# Patient Record
Sex: MALE | Race: OTHER | NOT HISPANIC OR LATINO
[De-identification: names, ages, dates, MRNs, and addresses within clinical notes are randomized per-mention and may not be internally consistent; named-entity substitution may affect disease eponyms.]

---

## 2020-03-13 PROBLEM — Z00.00 ENCOUNTER FOR PREVENTIVE HEALTH EXAMINATION: Status: ACTIVE | Noted: 2020-03-13

## 2020-04-06 ENCOUNTER — APPOINTMENT (OUTPATIENT)
Dept: PEDIATRIC ALLERGY IMMUNOLOGY | Facility: CLINIC | Age: 50
End: 2020-04-06
Payer: MEDICAID

## 2020-04-06 VITALS — SYSTOLIC BLOOD PRESSURE: 135 MMHG | WEIGHT: 315 LBS | HEART RATE: 118 BPM | DIASTOLIC BLOOD PRESSURE: 85 MMHG

## 2020-04-06 VITALS — HEIGHT: 66 IN | BODY MASS INDEX: 52.94 KG/M2

## 2020-04-06 DIAGNOSIS — G47.30 SLEEP APNEA, UNSPECIFIED: ICD-10-CM

## 2020-04-06 DIAGNOSIS — M51.26 OTHER INTERVERTEBRAL DISC DISPLACEMENT, LUMBAR REGION: ICD-10-CM

## 2020-04-06 DIAGNOSIS — Z86.79 PERSONAL HISTORY OF OTHER DISEASES OF THE CIRCULATORY SYSTEM: ICD-10-CM

## 2020-04-06 DIAGNOSIS — Z82.5 FAMILY HISTORY OF ASTHMA AND OTHER CHRONIC LOWER RESPIRATORY DISEASES: ICD-10-CM

## 2020-04-06 DIAGNOSIS — L85.3 XEROSIS CUTIS: ICD-10-CM

## 2020-04-06 DIAGNOSIS — Z83.6 FAMILY HISTORY OF OTHER DISEASES OF THE RESPIRATORY SYSTEM: ICD-10-CM

## 2020-04-06 DIAGNOSIS — J30.1 ALLERGIC RHINITIS DUE TO POLLEN: ICD-10-CM

## 2020-04-06 DIAGNOSIS — T39.015A ADVERSE EFFECT OF ASPIRIN, INITIAL ENCOUNTER: ICD-10-CM

## 2020-04-06 DIAGNOSIS — H10.13 ACUTE ATOPIC CONJUNCTIVITIS, BILATERAL: ICD-10-CM

## 2020-04-06 DIAGNOSIS — T36.0X5A ADVERSE EFFECT OF PENICILLINS, INITIAL ENCOUNTER: ICD-10-CM

## 2020-04-06 DIAGNOSIS — I10 ESSENTIAL (PRIMARY) HYPERTENSION: ICD-10-CM

## 2020-04-06 DIAGNOSIS — F17.200 NICOTINE DEPENDENCE, UNSPECIFIED, UNCOMPLICATED: ICD-10-CM

## 2020-04-06 PROCEDURE — 99204 OFFICE O/P NEW MOD 45 MIN: CPT

## 2020-04-06 RX ORDER — LEVALBUTEROL TARTRATE 45 UG/1
45 AEROSOL, METERED ORAL
Refills: 0 | Status: ACTIVE | COMMUNITY

## 2020-04-06 RX ORDER — KETOTIFEN FUMARATE 0.25 MG/ML
0.03 SOLUTION/ DROPS OPHTHALMIC
Qty: 1 | Refills: 3 | Status: ACTIVE | COMMUNITY
Start: 2020-04-06 | End: 1900-01-01

## 2020-04-06 RX ORDER — FLUTICASONE FUROATE AND VILANTEROL TRIFENATATE 200; 25 UG/1; UG/1
200-25 POWDER RESPIRATORY (INHALATION)
Refills: 0 | Status: ACTIVE | COMMUNITY

## 2020-04-06 RX ORDER — METOPROLOL SUCCINATE 50 MG/1
50 TABLET, EXTENDED RELEASE ORAL
Refills: 0 | Status: ACTIVE | COMMUNITY

## 2020-04-06 RX ORDER — DILTIAZEM HYDROCHLORIDE 180 MG/1
180 CAPSULE, COATED, EXTENDED RELEASE ORAL
Refills: 0 | Status: ACTIVE | COMMUNITY

## 2020-04-06 RX ORDER — HYDROCHLOROTHIAZIDE 25 MG/1
25 TABLET ORAL
Refills: 0 | Status: ACTIVE | COMMUNITY

## 2020-04-06 RX ORDER — MELOXICAM 15 MG/1
TABLET ORAL
Refills: 0 | Status: ACTIVE | COMMUNITY

## 2020-04-06 RX ORDER — CHROMIUM 200 MCG
TABLET ORAL
Refills: 0 | Status: ACTIVE | COMMUNITY

## 2020-04-06 RX ORDER — FLUTICASONE PROPIONATE 50 UG/1
50 SPRAY, METERED NASAL
Refills: 0 | Status: ACTIVE | COMMUNITY

## 2020-04-06 RX ORDER — CYCLOBENZAPRINE HYDROCHLORIDE 10 MG/1
10 TABLET, FILM COATED ORAL
Refills: 0 | Status: ACTIVE | COMMUNITY

## 2020-04-06 RX ORDER — PNV NO.95/FERROUS FUM/FOLIC AC 28MG-0.8MG
TABLET ORAL
Refills: 0 | Status: ACTIVE | COMMUNITY

## 2020-04-06 NOTE — HISTORY OF PRESENT ILLNESS
[16 - 19] : 16 - 19 [Eczematous rashes] : eczematous rashes [Food Allergies] : food allergies [de-identified] : DENTON ISAACS is a 49 year  old male with hypertension, back pain, sleep apnea, asthma and Allergic Rhinitis was referred by Dr Chappell for evaluation of elevated IgE.\par \par He has had asthma in childhood. He was asymptomatic until last summer when he got sick with Lyme. Dr Denton Kebede is his pulmonologist. He needed oral steroids once 8/2019 and his asthma has been well controlled on Breo.  \par He gained about 70 lb since last June. He is an  and he stopped working beginning of 2019 due to back pain. He has been receiving steroid? injections for back pain.\par \par Allergic Rhinitis:\par He has IgE positive to multiple environmental allergens, including dust mites, cat, dog, rose, molds, tree, grass and weed pollen. He has minor symptoms on nasal fluticasone and he reports that Dr Chappell is working on pre approval for Levocetirizine. \par He never had nasal polyps and his sense of smell is intact. \par \par His IgE was 15K (10/2019) and 6,586 (2/2020). One available CBC (2/2020) is normal without peripheral eosinophilia.\par He has not traveled abroad in over 20 years.\par \par 11/2019- hgb dropped from 15 to 10. He saw Dr Harris, hematologist that anemia was due to antibiotic (patient is not sure which one)  that he was treated for Lyme disease with. Anemia resolved. \par He has history of tachycardia which is controlled on a beta-blocker. \par \par Drug reactions:\par Penicillin- tongue and finger swelling as a child\par Aspirin- runny nose and itchy eyes, no SOB. He tolerates Mobic. \par  [FreeTextEntry7] : 18

## 2020-04-06 NOTE — REVIEW OF SYSTEMS
[Eye Discharge] : eye discharge [Eye Redness] : redness [Rhinorrhea] : rhinorrhea [Nasal Congestion] : nasal congestion [Snoring] : snoring [Post Nasal Drip] : post nasal drip [Wheezing] : wheezing [Dry Skin] : ~L dry skin [Fever] : no fever [Sneezing] : no sneezing [Cough] : no cough [Vomiting] : no vomiting [Diarrhea] : no diarrhea [Abdominal Pain] : no abdominal pain [Seizure] : no seizures [Headache] : no headache [Urticaria] : no urticaria [Atopic Dermatitis] : no atopic dermatitis [Pruritis] : no pruritis [Swelling] : no swelling [Easy Bruising] : no tendency for easy bruising [Easy Bleeding] : no ~M tendency for easy bleeding [Nosebleeds] : no epistaxis [Recurrent Sinus Infections] : no recurrent sinus infections [Recurrent Throat Infections] : no recurrence of throat infections [Recurrent Bronchitis] : no recurrent bronchitis [Recurrent Ear Infections] : no recurrence or ear infections [Recurrent Skin Infections] : no recurrent skin infections [Recurrent Pneumonia] : no ~T recurrent pneumonia [FreeTextEntry5] : HTN, h/o tachycardia [FreeTextEntry6] : worse at night [FreeTextEntry9] : back pain

## 2020-04-06 NOTE — CONSULT LETTER
[Dear  ___] : Dear  [unfilled], [Consult Letter:] : I had the pleasure of evaluating your patient, [unfilled]. [Please see my note below.] : Please see my note below. [Consult Closing:] : Thank you very much for allowing me to participate in the care of this patient.  If you have any questions, please do not hesitate to contact me. [Sincerely,] : Sincerely, [DrAnthony  ___] : Dr. WILLIAM [DrAnthony ___] : Dr. WILLIAM [FreeTextEntry3] : Ximena Walsh MD FAABERTHAI, NELL\par Adult and Pediatric Allergy, Asthma and Clinical Immunology\par  of Medicine and Pediatrics at\par   [facility]\par Section Head, Adult Allergy and Immunology\par   [facility]\par   Division of Allergy, Asthma and Immunology\par   [address], [place] [address]\par   [place], [place] [address]\par   Phone [phone]  Fax [phone]\par \par \par

## 2020-04-06 NOTE — DATA REVIEWED
[FreeTextEntry1] : IgE was 15K (10/2019) and 6,586 (2/2020). One available CBC didn't show peripheral eosinophilia\par \par IgE positive for multiple environmental allergens.

## 2020-04-06 NOTE — PHYSICAL EXAM
[Alert] : alert [No Acute Distress] : no acute distress [No Discharge] : no discharge [No Photophobia] : no photophobia [Sclera Not Icteric] : sclera not icteric [Conjunctival Erythema] : conjunctival erythema [Normal TMs] : both tympanic membranes were normal [No Thrush] : no thrush [No Oral Lesions or Ulcers] : no oral lesions or ulcers [Normal Rate and Effort] : normal respiratory rhythm and effort [No Crackles] : no crackles [Bilateral Audible Breath Sounds] : bilateral audible breath sounds [Normal Rate] : heart rate was normal  [Regular Rhythm] : with a regular rhythm [Soft] : abdomen soft [No HSM] : no hepato-splenomegaly [Normal Cervical Lymph Nodes] : cervical [Normal Axillary Lumph Nodes] : axillary [No Rash] : no rash [Xerosis] : xerosis [No clubbing] : no clubbing [No Edema] : no edema [No Cyanosis] : no cyanosis [Normal Mood] : mood was normal [Normal Affect] : affect was normal [Alert, Awake, Oriented as Age-Appropriate] : alert, awake, oriented as age appropriate [Wheezing] : no wheezing was heard

## 2020-04-07 LAB
BASOPHILS # BLD AUTO: 0.13 K/UL
BASOPHILS NFR BLD AUTO: 1.2 %
EOSINOPHIL # BLD AUTO: 0.1 K/UL
EOSINOPHIL NFR BLD AUTO: 0.9 %
HCT VFR BLD CALC: 50.3 %
HGB BLD-MCNC: 16.8 G/DL
IMM GRANULOCYTES NFR BLD AUTO: 0.4 %
LYMPHOCYTES # BLD AUTO: 2.81 K/UL
LYMPHOCYTES NFR BLD AUTO: 25.1 %
MAN DIFF?: NORMAL
MCHC RBC-ENTMCNC: 30.7 PG
MCHC RBC-ENTMCNC: 33.4 GM/DL
MCV RBC AUTO: 92 FL
MONOCYTES # BLD AUTO: 0.96 K/UL
MONOCYTES NFR BLD AUTO: 8.6 %
NEUTROPHILS # BLD AUTO: 7.17 K/UL
NEUTROPHILS NFR BLD AUTO: 63.8 %
PLATELET # BLD AUTO: 323 K/UL
RBC # BLD: 5.47 M/UL
RBC # FLD: 14.6 %
WBC # FLD AUTO: 11.21 K/UL

## 2020-04-09 LAB — TOTAL IGE SMQN RAST: 7590 KU/L

## 2021-06-02 ENCOUNTER — APPOINTMENT (OUTPATIENT)
Dept: GERIATRICS | Facility: CLINIC | Age: 51
End: 2021-06-02

## 2021-06-21 ENCOUNTER — APPOINTMENT (OUTPATIENT)
Dept: PEDIATRIC ALLERGY IMMUNOLOGY | Facility: CLINIC | Age: 51
End: 2021-06-21

## 2021-06-21 ENCOUNTER — APPOINTMENT (OUTPATIENT)
Dept: PEDIATRIC ALLERGY IMMUNOLOGY | Facility: CLINIC | Age: 51
End: 2021-06-21
Payer: MEDICAID

## 2021-06-21 ENCOUNTER — NON-APPOINTMENT (OUTPATIENT)
Age: 51
End: 2021-06-21

## 2021-06-21 VITALS
HEART RATE: 82 BPM | TEMPERATURE: 97.3 F | RESPIRATION RATE: 16 BRPM | DIASTOLIC BLOOD PRESSURE: 74 MMHG | SYSTOLIC BLOOD PRESSURE: 109 MMHG

## 2021-06-21 VITALS — WEIGHT: 315 LBS | BODY MASS INDEX: 54.68 KG/M2

## 2021-06-21 DIAGNOSIS — J30.89 OTHER ALLERGIC RHINITIS: ICD-10-CM

## 2021-06-21 DIAGNOSIS — Z51.6 ENCOUNTER FOR DESENSITIZATION TO ALLERGENS: ICD-10-CM

## 2021-06-21 DIAGNOSIS — R76.8 OTHER SPECIFIED ABNORMAL IMMUNOLOGICAL FINDINGS IN SERUM: ICD-10-CM

## 2021-06-21 DIAGNOSIS — Z88.8 ALLERGY STATUS TO OTHER DRUGS, MEDICAMENTS AND BIOLOGICAL SUBSTANCES: ICD-10-CM

## 2021-06-21 LAB — SARS-COV-2 N GENE NPH QL NAA+PROBE: NOT DETECTED

## 2021-06-21 PROCEDURE — 95180 RAPID DESENSITIZATION: CPT

## 2021-06-25 PROBLEM — Z51.6 NEED FOR DESENSITIZATION TO ALLERGENS: Status: ACTIVE | Noted: 2021-06-25

## 2021-06-25 NOTE — PHYSICAL EXAM
[Alert] : alert [Well Nourished] : well nourished [No Acute Distress] : no acute distress [Well Developed] : well developed [Sclera Not Icteric] : sclera not icteric [Normal TMs] : both tympanic membranes were normal [Normal Tonsils] : normal tonsils [Normal Rate and Effort] : normal respiratory rhythm and effort [No Crackles] : no crackles [Bilateral Audible Breath Sounds] : bilateral audible breath sounds [Normal Rate] : heart rate was normal  [Normal S1, S2] : normal S1 and S2 [No murmur] : no murmur [Regular Rhythm] : with a regular rhythm [Skin Intact] : skin intact  [No Rash] : no rash [No Skin Lesions] : no skin lesions [Normal Mood] : mood was normal [Normal Affect] : affect was normal [Judgment and Insight Age Appropriate] : judgement and insight is age appropriate [Alert, Awake, Oriented as Age-Appropriate] : alert, awake, oriented as age appropriate [Conjunctival Erythema] : no conjunctival erythema [Boggy Nasal Turbinates] : no boggy and/or pale nasal turbinates [Pharyngeal erythema] : no pharyngeal erythema [Posterior Pharyngeal Cobblestoning] : no posterior pharyngeal cobblestoning [Clear Rhinorrhea] : no clear rhinorrhea was seen [Exudate] : no exudate [Wheezing] : no wheezing was heard [Patches] : no patches

## 2021-06-25 NOTE — REVIEW OF SYSTEMS
[Nl] : Integumentary [Fatigue] : no fatigue [Eye Discharge] : no eye discharge [Eye Redness] : no redness [Puffy Eyelids] : no puffy ~T eyelids [Bloodshot Eyes] : no bloodshot ~T eyes [Rhinorrhea] : no rhinorrhea [Snoring] : no snoring [Post Nasal Drip] : no post nasal drip [Cyanosis] : no cyanosis [Chest Pain] : no chest pain or discomfort [Cough] : no cough [Wheezing Worsens With Exercise] : wheezing does not worsen with exercise [Wheezing] : no wheezing

## 2021-06-25 NOTE — CONSULT LETTER
[Dear  ___] : Dear  [unfilled], [Consult Letter:] : I had the pleasure of evaluating your patient, [unfilled]. [Please see my note below.] : Please see my note below. [Consult Closing:] : Thank you very much for allowing me to participate in the care of this patient.  If you have any questions, please do not hesitate to contact me. [Sincerely,] : Sincerely, [FreeTextEntry2] : Dr Reis, cardiology , [place].  [FreeTextEntry3] : [facility] Nunes [facility]\par \par MD SIERRA Harmon, NELL\par Adult and Pediatric Allergy, Asthma and Clinical Immunology\par  of Medicine and Pediatrics at\par   [facility]\par Section Head, Adult Allergy and Immunology\par   [facility]\par   Division of Allergy, Asthma and Immunology\par   [address] [place] [address], [address]\par   [place], [place] [address]\par   Phone [phone]  Fax [phone]\par \par

## 2021-06-25 NOTE — HISTORY OF PRESENT ILLNESS
[Asthma] : asthma  [] : The following medications are to be available during the challenge procedure: [Consent obtained and signed form scanned in to chart] : Consent obtained and signed form scanned in to chart [Diphenhydramine] : Diphenhydramine, 1-2mg/kg IM (max dose 50mg), (50mg/1 cc) [Solucortef] : Solucortef, 4-8 mg/kg IM (max dose 200 mg), (100mg/2 cc) [Epinephrine 1:1000 IM] : Epinephrine 1:1000 IM, 0.01cc/kg (max dose 0.5 cc) [Albuterol MDI] : Albuterol MDI, 2 - 4 puffs [Albuterol nebulized] : Albuterol nebulized, 0.083% [_______] : Time: [unfilled] [Clear] : Skin Findings: Clear [No] : Reaction: No [___] : RR: [unfilled]  [____] : IVB: [unfilled] [___] : Time: [unfilled] [___% 1) Skin -  A) Erythematous rash - % area involved] : Erythematous Rash (IA): [unfilled] % area involved [0 Pruritus: 0  - absent] : Pruritus (IB): 0 - absent [0 Urticaria/Angioedema: 0 - Absent] : Urticaria/Angioedema (IC): 0  - Absent [0 Rash: 0 - Absent] : Rash (ID): 0 - Absent [0 Sneezing/Itchin - Absent] : Sneezing/Itching (IIA): 0 - Absent [0 Nasal congestion: 0 - Absent] : Nasal congestion (IIB): 0 - Absent [0 Rhinorrhea: 0 - Absent] : Rhinorrhea (IIC): 0 - Absent [0 Laryngeal: 0 - Absent] : Laryngeal (IID): 0 - Absent [0 Wheezin - Absent] : Wheezing (IIIA): 0 - Absent [0 Gastro-Subjective complaints: 0 - Absent] : Gastro-Subjective Complaints (NAEL): 0 - Absent [0 Gastro-Objective complaints: 0 - Absent] : Gastro-Objective Complaints (IVB): 0 - Absent [Antihistamine use in past 5 days] : No antihistamine use in past 5 days [Recent Illness] : no recent illness [Fever] : no fever [de-identified] : PINKY ISAACS is a 50 year old male with hypertension, back pain, sleep apnea, asthma and Allergic Rhinitis, elevated IgE here for Aspirin desensitization. The patient needs a cardiac stent, thus was refereed by his cardiologist for  Aspirin desensitization. \par \par Drug reactions:\par Penicillin- tongue and finger swelling as a child. Unsure if it was immediate or delayed. No oral ulcer, desquamation of skin or ER visit. \par Aspirin- runny nose and itchy eyes, no SOB. He tolerates Mobic. \par \par Asthma: \par Managed by pulm. Well controlled on Xolair( started three months ago) and  trelegy. Denies any wheezing, shortness of breath or chest tightness. Last use of Levalbuterol was Saturday, due shortness of breath, with improvement. No oral steroid use within last year. Followed by Pulm, at [facility]. Dr Denton Kebede. ACT 19. \par \par Elevated IGE:\par Followed by outside allergist. \par IgE - 7K, stable. Last IGE was on OCt 2020- 6500.  On Azelastine and fluticasone. \par \par Allergic Rhinitis:\par He has IgE positive to multiple environmental allergens, including dust mites, cat, dog, rose, molds, tree, grass and weed pollen. He has minor symptoms on nasal fluticasone and he reports that Dr Chappell is working on pre approval for Levocetirizine. \par He never had nasal polyps and his sense of smell is intact. \par \par His IgE was 15K (10/2019) and 6,586 (2/2020). One available CBC (2/2020) is normal without peripheral eosinophilia.\par He has not traveled abroad in over 20 years.\par \par \par \par \par No nasal polyps. \par \par \par Past history. \par  He was asymptomatic until last summer when he got sick with Lyme. Dr Denton Kebede is his pulmonologist. He needed oral steroids once 8/2019 and his asthma has been well controlled on Breo. \par He gained about 70 lb since last June. He is an  and he stopped working beginning of 2019 due to back pain. He has been receiving steroid? injections for back pain.\par \par Allergic Rhinitis:\par He has IgE positive to multiple environmental allergens, including dust mites, cat, dog, rose, molds, tree, grass and weed pollen. He has minor symptoms on nasal fluticasone and he reports that Dr Chappell is working on pre approval for Levocetirizine. \par He never had nasal polyps and his sense of smell is intact. \par \par His IgE was 15K (10/2019) and 6,586 (2/2020). One available CBC (2/2020) is normal without peripheral eosinophilia.\par He has not traveled abroad in over 20 years.\par \par 11/2019- hgb dropped from 15 to 10. He saw Dr Harris, hematologist that anemia was due to antibiotic (patient is not sure which one) that he was treated for Lyme disease with. Anemia resolved. \par He has history of tachycardia which is controlled on a beta-blocker. \par \par \par  \par No history or symptoms of eczematous rashes, food allergies. \par \par ACT Questionnaire Group: 16 - 19 \par ACT Questionnaire Score: 18 \par  [FreeTextEntry1] : Aspirin  [FreeTextEntry2] : 100mg

## 2021-07-07 ENCOUNTER — TRANSCRIPTION ENCOUNTER (OUTPATIENT)
Age: 51
End: 2021-07-07

## 2021-07-09 DIAGNOSIS — Z01.818 ENCOUNTER FOR OTHER PREPROCEDURAL EXAMINATION: ICD-10-CM

## 2021-07-10 ENCOUNTER — APPOINTMENT (OUTPATIENT)
Dept: DISASTER EMERGENCY | Facility: CLINIC | Age: 51
End: 2021-07-10

## 2021-07-11 LAB — SARS-COV-2 N GENE NPH QL NAA+PROBE: NOT DETECTED

## 2021-07-12 RX ORDER — CHLORHEXIDINE GLUCONATE 213 G/1000ML
1 SOLUTION TOPICAL ONCE
Refills: 0 | Status: DISCONTINUED | OUTPATIENT
Start: 2021-07-13 | End: 2021-07-28

## 2021-07-12 NOTE — H&P PST ADULT - NSICDXPASTMEDICALHX_GEN_ALL_CORE_FT
PAST MEDICAL HISTORY:  Abnormal stress test     HLD (hyperlipidemia)     HTN (hypertension)     Severe obesity

## 2021-07-12 NOTE — H&P PST ADULT - ASSESSMENT
49 yo male with c/o BARRETT and an abnormal stress test.     Plan: PRE-PROCEDURE ASSESSMENT    -Patient seen and examined  PRE-PROCEDURE ASSESSMENT  -NPO after midnight confirmed  -IV insert  -Patient seen and examined  -Labs reviewed  -Pre-procedure teaching completed with patient   -consent obtained   -Questions answered     49 yo male with cardiac history significant for Htn, HLD, obesity, current smoker c/o BARRETT and + NST, plan for LHC via RA vs FA by Dr. Hernandez.     -plan for LHC via RA  -patient seen and examined  -confirmed appropriate NPO duration  -ECG and Labs reviewed  -Aspirin 81mg po pre-cath  -Percocet tab X 1 for chronic back pain  -procedure discussed with patient; risks and benefits explained, questions answered  -consent obtained

## 2021-07-12 NOTE — H&P PST ADULT - HISTORY OF PRESENT ILLNESS
Narrative: This is a 49 yo male with PMHx of HTN, HLD, and obesity.  He presented to his cardiologist with c/o BARRETT and was sent for an echocardiogram and stress test.  During his stress test he exhibited 0.5-0.9mm downsloping ST depression and a small sized equivocally abnormal, inferior , fixed defect was evidenced on imaging.      ASA  Mallampati  GFR  Creat  Bleeding Risk  COVID19 - Covid negative 7/11/2021    Symptoms:        Angina (Class): 3       Ischemic Symptoms: BARRETT     Heart Failure: No            Assessment of LVEF:       EF: 55-60%       Assessed by: TTE        Date: 5/26/2021    Prior Cardiac Interventions: None          Noninvasive Testing:   Stress Test: Date: 5/27/2021       Protocol: Regadenoson Sestamibi Stress       Duration of Exercise: NA       Symptoms: Dyspnea       EKG Changes: 0.5-0.9 mm downsloping ST depression       DTS: NA       Myocardial Imaging:  small sized equivocally abnormal, inferior , fixed defect        Risk Assessment: low    Echo: 5/26/2021  Technically difficult study  Normal global LV contractility  LV systolic function normal, EF 55-60%  Moderate LVH  Normal diastolic filling pattern  RV moderately enlarged  Trace mitral regurgitation  Trace tricuspid regurgitation      Antianginal Therapies:        Beta Blockers:  Metoprolol 50 mg po BID       Calcium Channel Blockers: Dardizem  mg po Daily       Long Acting Nitrates:        Ranexa:     Associated Risk Factors:        Cerebrovascular Disease: N/A       Chronic Lung Disease: N/A       Peripheral Arterial Disease: N/A       Chronic Kidney Disease (if yes, what is GFR): N/A       Uncontrolled Diabetes (if yes, what is HgbA1C or FBS): N/A       Poorly Controlled Hypertension (if yes, what is SBP): N/A       Morbid Obesity (if yes, what is BMI): Yes       History of Recent Ventricular Arrhythmia: N/A       Inability to Ambulate Safely: N/A       Need for Therapeutic Anticoagulation: N/A       Antiplatelet or Contrast Allergy: N/A Narrative: This is a 49 yo male with PMHx of HTN, HLD, obesity (BMI 55), LU on CPAP, Asthma, current smoker. He presented to his cardiologist with c/o BARRETT and was sent for an echocardiogram and stress test.  During his stress test he exhibited 0.5-0.9mm downsloping ST depression and a small sized equivocally abnormal, inferior , fixed defect was evidenced on imaging.      ASA 3  Mallampati 3  GFR  Creat  Bleeding Risk  COVID19 - Covid negative 7/11/2021    Symptoms:        Angina (Class): 3       Ischemic Symptoms: BARRETT     Heart Failure: No            Assessment of LVEF:       EF: 55-60%       Assessed by: TTE        Date: 5/26/2021    Prior Cardiac Interventions: None          Noninvasive Testing:   Stress Test: Date: 5/27/2021       Protocol: Regadenoson Sestamibi Stress       Duration of Exercise: NA       Symptoms: Dyspnea       EKG Changes: 0.5-0.9 mm downsloping ST depression       DTS: NA       Myocardial Imaging:  small sized equivocally abnormal, inferior , fixed defect        Risk Assessment: low    Echo: 5/26/2021  Technically difficult study  Normal global LV contractility  LV systolic function normal, EF 55-60%  Moderate LVH  Normal diastolic filling pattern  RV moderately enlarged  Trace mitral regurgitation  Trace tricuspid regurgitation      Antianginal Therapies:        Beta Blockers:  Metoprolol 50 mg po BID       Calcium Channel Blockers: Dardizem  mg po Daily       Long Acting Nitrates:        Ranexa:     Associated Risk Factors:        Cerebrovascular Disease: N/A       Chronic Lung Disease: yes, LU on CPAP, Asthma, current smoker       Peripheral Arterial Disease: N/A       Chronic Kidney Disease (if yes, what is GFR): N/A       Uncontrolled Diabetes (if yes, what is HgbA1C or FBS): N/A       Poorly Controlled Hypertension (if yes, what is SBP): N/A       Morbid Obesity (if yes, what is BMI): Yes BMI 55       History of Recent Ventricular Arrhythmia: N/A       Inability to Ambulate Safely: N/A       Need for Therapeutic Anticoagulation: N/A       Antiplatelet or Contrast Allergy: Desensitized to ASA and tolerated 81mg po daily    ROS: as stated above, otherwise negative    PHYSICAL EXAM:  Constitutional: A & O x 3  HEENT:   Normal oral mucosa, PERRL, EOMI	  Cardiovascular: Normal S1 S2, No JVD, No murmurs, No edema  Respiratory: Lungs clear to auscultation	  Gastrointestinal:  Soft, Non-tender, + BS	  Skin: No rashes, No ecchymoses, No cyanosis  Neurologic: Non-focal  Extremities: Normal range of motion, No clubbing, cyanosis or edema  Vascular: Peripheral pulses palpable 2+ bilaterally     Narrative: This is a 49 yo male with PMHx of HTN, HLD, obesity (BMI 55), LU on CPAP, Asthma, current smoker. He presented to his cardiologist with c/o BARRETT and was sent for an echocardiogram and stress test.  During his stress test he exhibited 0.5-0.9mm downsloping ST depression and a small sized equivocally abnormal, inferior , fixed defect was evidenced on imaging.      ASA 3  Mallampati 3  GFR 99  Creat 0.9  Bleeding Risk  COVID19 - Covid negative 7/11/2021    Symptoms:        Angina (Class): 3       Ischemic Symptoms: BARRETT     Heart Failure: No            Assessment of LVEF:       EF: 55-60%       Assessed by: TTE        Date: 5/26/2021    Prior Cardiac Interventions: None          Noninvasive Testing:   Stress Test: Date: 5/27/2021       Protocol: Regadenoson Sestamibi Stress       Duration of Exercise: NA       Symptoms: Dyspnea       EKG Changes: 0.5-0.9 mm downsloping ST depression       DTS: NA       Myocardial Imaging:  small sized equivocally abnormal, inferior , fixed defect        Risk Assessment: low    Echo: 5/26/2021  Technically difficult study  Normal global LV contractility  LV systolic function normal, EF 55-60%  Moderate LVH  Normal diastolic filling pattern  RV moderately enlarged  Trace mitral regurgitation  Trace tricuspid regurgitation      Antianginal Therapies:        Beta Blockers:  Metoprolol 50 mg po BID       Calcium Channel Blockers: Dardizem  mg po Daily       Long Acting Nitrates:        Ranexa:     Associated Risk Factors:        Cerebrovascular Disease: N/A       Chronic Lung Disease: yes, LU on CPAP, Asthma, current smoker       Peripheral Arterial Disease: N/A       Chronic Kidney Disease (if yes, what is GFR): N/A       Uncontrolled Diabetes (if yes, what is HgbA1C or FBS): N/A       Poorly Controlled Hypertension (if yes, what is SBP): N/A       Morbid Obesity (if yes, what is BMI): Yes BMI 55       History of Recent Ventricular Arrhythmia: N/A       Inability to Ambulate Safely: N/A       Need for Therapeutic Anticoagulation: N/A       Antiplatelet or Contrast Allergy: Desensitized to ASA and tolerated 81mg po daily    ROS: as stated above, otherwise negative    PHYSICAL EXAM:  Constitutional: A & O x 3  HEENT:   Normal oral mucosa, PERRL, EOMI	  Cardiovascular: Normal S1 S2, No JVD, No murmurs, No edema  Respiratory: Lungs clear to auscultation	  Gastrointestinal:  Soft, Non-tender, + BS	  Skin: No rashes, No ecchymoses, No cyanosis  Neurologic: Non-focal  Extremities: Normal range of motion, No clubbing, cyanosis or edema  Vascular: Peripheral pulses palpable 2+ bilaterally

## 2021-07-13 ENCOUNTER — TRANSCRIPTION ENCOUNTER (OUTPATIENT)
Age: 51
End: 2021-07-13

## 2021-07-13 ENCOUNTER — OUTPATIENT (OUTPATIENT)
Dept: OUTPATIENT SERVICES | Facility: HOSPITAL | Age: 51
LOS: 1 days | End: 2021-07-13
Payer: MEDICAID

## 2021-07-13 VITALS
SYSTOLIC BLOOD PRESSURE: 143 MMHG | HEART RATE: 70 BPM | DIASTOLIC BLOOD PRESSURE: 81 MMHG | RESPIRATION RATE: 18 BRPM | OXYGEN SATURATION: 96 %

## 2021-07-13 VITALS
HEART RATE: 78 BPM | DIASTOLIC BLOOD PRESSURE: 79 MMHG | RESPIRATION RATE: 15 BRPM | OXYGEN SATURATION: 95 % | SYSTOLIC BLOOD PRESSURE: 139 MMHG

## 2021-07-13 DIAGNOSIS — R94.39 ABNORMAL RESULT OF OTHER CARDIOVASCULAR FUNCTION STUDY: ICD-10-CM

## 2021-07-13 LAB
ALBUMIN SERPL ELPH-MCNC: 4.8 G/DL — SIGNIFICANT CHANGE UP (ref 3.3–5.2)
ALP SERPL-CCNC: 158 U/L — HIGH (ref 40–120)
ALT FLD-CCNC: 41 U/L — HIGH
ANION GAP SERPL CALC-SCNC: 15 MMOL/L — SIGNIFICANT CHANGE UP (ref 5–17)
AST SERPL-CCNC: 23 U/L — SIGNIFICANT CHANGE UP
BASOPHILS # BLD AUTO: 0.07 K/UL — SIGNIFICANT CHANGE UP (ref 0–0.2)
BASOPHILS NFR BLD AUTO: 0.6 % — SIGNIFICANT CHANGE UP (ref 0–2)
BILIRUB SERPL-MCNC: 0.4 MG/DL — SIGNIFICANT CHANGE UP (ref 0.4–2)
BUN SERPL-MCNC: 14.2 MG/DL — SIGNIFICANT CHANGE UP (ref 8–20)
CALCIUM SERPL-MCNC: 9.8 MG/DL — SIGNIFICANT CHANGE UP (ref 8.6–10.2)
CHLORIDE SERPL-SCNC: 103 MMOL/L — SIGNIFICANT CHANGE UP (ref 98–107)
CO2 SERPL-SCNC: 23 MMOL/L — SIGNIFICANT CHANGE UP (ref 22–29)
CREAT SERPL-MCNC: 0.9 MG/DL — SIGNIFICANT CHANGE UP (ref 0.5–1.3)
EOSINOPHIL # BLD AUTO: 0.47 K/UL — SIGNIFICANT CHANGE UP (ref 0–0.5)
EOSINOPHIL NFR BLD AUTO: 4.2 % — SIGNIFICANT CHANGE UP (ref 0–6)
GLUCOSE SERPL-MCNC: 117 MG/DL — HIGH (ref 70–99)
HCT VFR BLD CALC: 48 % — SIGNIFICANT CHANGE UP (ref 39–50)
HGB BLD-MCNC: 16.1 G/DL — SIGNIFICANT CHANGE UP (ref 13–17)
IMM GRANULOCYTES NFR BLD AUTO: 0.4 % — SIGNIFICANT CHANGE UP (ref 0–1.5)
LYMPHOCYTES # BLD AUTO: 18 % — SIGNIFICANT CHANGE UP (ref 13–44)
LYMPHOCYTES # BLD AUTO: 2 K/UL — SIGNIFICANT CHANGE UP (ref 1–3.3)
MCHC RBC-ENTMCNC: 29.9 PG — SIGNIFICANT CHANGE UP (ref 27–34)
MCHC RBC-ENTMCNC: 33.5 GM/DL — SIGNIFICANT CHANGE UP (ref 32–36)
MCV RBC AUTO: 89.2 FL — SIGNIFICANT CHANGE UP (ref 80–100)
MONOCYTES # BLD AUTO: 0.81 K/UL — SIGNIFICANT CHANGE UP (ref 0–0.9)
MONOCYTES NFR BLD AUTO: 7.3 % — SIGNIFICANT CHANGE UP (ref 2–14)
NEUTROPHILS # BLD AUTO: 7.74 K/UL — HIGH (ref 1.8–7.4)
NEUTROPHILS NFR BLD AUTO: 69.5 % — SIGNIFICANT CHANGE UP (ref 43–77)
PLATELET # BLD AUTO: 346 K/UL — SIGNIFICANT CHANGE UP (ref 150–400)
POTASSIUM SERPL-MCNC: 4.1 MMOL/L — SIGNIFICANT CHANGE UP (ref 3.5–5.3)
POTASSIUM SERPL-SCNC: 4.1 MMOL/L — SIGNIFICANT CHANGE UP (ref 3.5–5.3)
PROT SERPL-MCNC: 7.9 G/DL — SIGNIFICANT CHANGE UP (ref 6.6–8.7)
RBC # BLD: 5.38 M/UL — SIGNIFICANT CHANGE UP (ref 4.2–5.8)
RBC # FLD: 12.7 % — SIGNIFICANT CHANGE UP (ref 10.3–14.5)
SODIUM SERPL-SCNC: 141 MMOL/L — SIGNIFICANT CHANGE UP (ref 135–145)
WBC # BLD: 11.13 K/UL — HIGH (ref 3.8–10.5)
WBC # FLD AUTO: 11.13 K/UL — HIGH (ref 3.8–10.5)

## 2021-07-13 PROCEDURE — 93010 ELECTROCARDIOGRAM REPORT: CPT

## 2021-07-13 PROCEDURE — 80053 COMPREHEN METABOLIC PANEL: CPT

## 2021-07-13 PROCEDURE — C1887: CPT

## 2021-07-13 PROCEDURE — 85025 COMPLETE CBC W/AUTO DIFF WBC: CPT

## 2021-07-13 PROCEDURE — 36415 COLL VENOUS BLD VENIPUNCTURE: CPT

## 2021-07-13 PROCEDURE — C1894: CPT

## 2021-07-13 PROCEDURE — 93005 ELECTROCARDIOGRAM TRACING: CPT

## 2021-07-13 PROCEDURE — 99152 MOD SED SAME PHYS/QHP 5/>YRS: CPT

## 2021-07-13 PROCEDURE — 93458 L HRT ARTERY/VENTRICLE ANGIO: CPT

## 2021-07-13 PROCEDURE — C1769: CPT

## 2021-07-13 RX ORDER — AZELASTINE 137 UG/1
2 SPRAY, METERED NASAL
Qty: 0 | Refills: 0 | DISCHARGE

## 2021-07-13 RX ORDER — ASPIRIN/CALCIUM CARB/MAGNESIUM 324 MG
81 TABLET ORAL
Qty: 0 | Refills: 0 | DISCHARGE

## 2021-07-13 RX ORDER — SIMVASTATIN 20 MG/1
1 TABLET, FILM COATED ORAL
Qty: 0 | Refills: 0 | DISCHARGE

## 2021-07-13 RX ORDER — ERGOCALCIFEROL 1.25 MG/1
1 CAPSULE ORAL
Qty: 0 | Refills: 0 | DISCHARGE

## 2021-07-13 RX ORDER — MORPHINE SULFATE 50 MG/1
15 CAPSULE, EXTENDED RELEASE ORAL
Qty: 0 | Refills: 0 | DISCHARGE

## 2021-07-13 RX ORDER — DILTIAZEM HCL 120 MG
1 CAPSULE, EXT RELEASE 24 HR ORAL
Qty: 0 | Refills: 0 | DISCHARGE

## 2021-07-13 RX ORDER — FLUTICASONE FUROATE, UMECLIDINIUM BROMIDE AND VILANTEROL TRIFENATATE 200; 62.5; 25 UG/1; UG/1; UG/1
1 POWDER RESPIRATORY (INHALATION)
Qty: 0 | Refills: 0 | DISCHARGE

## 2021-07-13 RX ORDER — METOPROLOL TARTRATE 50 MG
1 TABLET ORAL
Qty: 0 | Refills: 0 | DISCHARGE

## 2021-07-13 RX ORDER — OXYCODONE AND ACETAMINOPHEN 5; 325 MG/1; MG/1
1 TABLET ORAL EVERY 4 HOURS
Refills: 0 | Status: DISCONTINUED | OUTPATIENT
Start: 2021-07-13 | End: 2021-07-13

## 2021-07-13 RX ORDER — LISINOPRIL 2.5 MG/1
1 TABLET ORAL
Qty: 0 | Refills: 0 | DISCHARGE

## 2021-07-13 RX ORDER — LEVALBUTEROL 1.25 MG/.5ML
0.5 SOLUTION, CONCENTRATE RESPIRATORY (INHALATION)
Qty: 0 | Refills: 0 | DISCHARGE

## 2021-07-13 RX ORDER — FUROSEMIDE 40 MG
1 TABLET ORAL
Qty: 0 | Refills: 0 | DISCHARGE

## 2021-07-13 RX ORDER — FLUTICASONE PROPIONATE 50 MCG
1 SPRAY, SUSPENSION NASAL
Qty: 0 | Refills: 0 | DISCHARGE

## 2021-07-13 RX ADMIN — OXYCODONE AND ACETAMINOPHEN 1 TABLET(S): 5; 325 TABLET ORAL at 13:32

## 2021-07-13 NOTE — DISCHARGE NOTE PROVIDER - NSDCCPTREATMENT_GEN_ALL_CORE_FT
PRINCIPAL PROCEDURE  Procedure: Left heart cardiac cath  Findings and Treatment: Restricted use with no heavy lifting of affected arm for 48 hours.  No submerging the arm in water for 48 hours.  You may start showering today.  Call your doctor for any bleeding, swelling, loss of sensation in the hand or fingers, or fingers turning blue.  If heavy bleeding or large lumps form, hold pressure at the spot and come to the Emergency Room.

## 2021-07-13 NOTE — DISCHARGE NOTE NURSING/CASE MANAGEMENT/SOCIAL WORK - PATIENT PORTAL LINK FT
You can access the FollowMyHealth Patient Portal offered by [facility] by registering at the following website: [email or web address]. By joining [employer]’s FollowMyHealth portal, you will also be able to view your health information using other applications (apps) compatible with our system.

## 2021-07-13 NOTE — DISCHARGE NOTE PROVIDER - HOSPITAL COURSE
51 yo male with PMHx of HTN, HLD, obesity (BMI 55), LU on CPAP, Asthma, current smoker. He presented to his cardiologist with c/o BARRETT and was sent for an echocardiogram and stress test.  During his stress test he exhibited 0.5-0.9mm downsloping ST depression and a small sized equivocally abnormal, inferior , fixed defect was evidenced on imaging.        -plan for LHC via RA  -patient seen and examined  -confirmed appropriate NPO duration  -ECG and Labs reviewed  -Aspirin 81mg po pre-cath  -Percocet tab X 1 for chronic back pain  -procedure discussed with patient; risks and benefits explained, questions answered  -consent obtained       Now s/p left heart catheterization via RRA with no obstructive CAD performed by  Dr. Hernandez, received IA heparin, versed and fentanyl IV intraprocedurally, arrived to recovery in NAD and HDS, RRA access site stable with compression band in place, no bleed/hematoma, distal pulse +, plan to d/C home after recovery period completed.           Plan:  -Formal cath report pending  -Post procedure management/monitoring per protocol  -Access site precautions  -Radial compression band removal at 1630  -Continue current medical therapy  -Educated regarding post procedure management and care  -Discussed the importance of RF modification  -F/U outpt in 1-2 weeks with primary doctor.   -DISPO: Plan for D/C home later today once recovery period completed.

## 2021-07-13 NOTE — DISCHARGE NOTE PROVIDER - NSDCCPCAREPLAN_GEN_ALL_CORE_FT
PRINCIPAL DISCHARGE DIAGNOSIS  Diagnosis: BARRETT (dyspnea on exertion)  Assessment and Plan of Treatment: cardiac catheterization ruled out coronary artery disease as cause for shortness of breath.   Known Asthma, LU on CPAP and smoking. Cont to use bronchodilators and CPAP and f/u with pulmonology as needed. Most former smokers quit without using one of the treatments that scientific research has shown can work. However, the following treatments are proven to be effective for smokers who want help to quit:  •Brief help by a doctor (such as when a doctor takes 10 minutes or less to give a patient advice and assistance about quitting)  •Individual, group, or telephone counseling  •Behavioral therapies (such as training in problem solving)  •Treatments with more person-to-person contact and more intensity (such as more or longer counseling sessions)  •Programs to deliver treatments using mobile phones  Medications for quitting that have been found to be effective include the following:  •Nicotine replacement products       Over-the-counter (nicotine patch [which is also available by prescription], gum, lozenge)       Prescription (nicotine patch, inhaler, nasal spray)  •Prescription non-nicotine medications: bupropion SR (Zyban®), varenicline tartrate (Chantix®)        SECONDARY DISCHARGE DIAGNOSES  Diagnosis: HLD (hyperlipidemia)  Assessment and Plan of Treatment: and obesity, cont current medication regimen, Choose lean meats and poultry without skin and prepare them without added saturated and trans fat.  Eat fish at least twice a week. Recent research shows that eating oily fish containing omega-3 fatty acids (for example, salmon, trout and herring) may help lower your risk of death from coronary artery disease.  Select fat-free, 1 percent fat and low-fat dairy products.  Cut back on foods containing partially hydrogenated vegetable oils to reduce trans fat in your diet.   To lower cholesterol, reduce saturated fat to no more than 5 to 6 percent of total calories. For someone eating 2,000 calories a day, that’s about 13 grams of saturated fat.  Cut back on beverages and foods with added sugars.  Choose and prepare foods with little or no salt. To lower blood pressure, aim to eat no more than 2,400 milligrams of sodium per day. Reducing daily intake to 1,500 mg is desirable because it can lower blood pressure even further.  If you drink alcohol, drink in moderation. That means one drink per day if you’re a woman and two drinks  per day if you’re a man.  Follow the American Heart Association recommendations when you eat out, and keep an eye on your portion sizes.      Diagnosis: HTN (hypertension)  Assessment and Plan of Treatment: Cont current antihypertensive medications    Diagnosis: Chronic pain syndrome  Assessment and Plan of Treatment: related to vertebral disc disease and osteoarthritis: cont current pain management regimen and f/u with pain management if requiring adjustments.

## 2021-07-13 NOTE — PROGRESS NOTE ADULT - SUBJECTIVE AND OBJECTIVE BOX
Department of Cardiology                                                                  [facility]/[place] [address] [address] [facility]                                                            Telephone: [phone]. Fax:[phone]                                                    Post- Procedure Note: Left Heart Cardiac Catheterization       Narrative:  51y  Male now s/p left heart catheterization via RRA with no obstructive CAD performed by  Dr. Hernandez, received IA heparin, versed and fentanyl IV intraprocedurally, arrived to recovery in NAD and HDS, RRA access site stable with compression band in place, no bleed/hematoma, distal pulse +, plan to d/C home after recovery period completed.           PAST MEDICAL & SURGICAL HISTORY:  HTN (hypertension)  HLD (hyperlipidemia)  Severe obesity  Abnormal stress test      Home Medications:  aspirin 81 mg oral tablet: 81 milligram(s) orally once a day (13 Jul 2021 13:03)  azelastine 137 mcg/inh (0.1%) nasal spray: 2 spray(s) nasal 2 times a day (13 Jul 2021 13:03)  dilTIAZem 180 mg/24 hours oral capsule, extended release: 1 cap(s) orally once a day (13 Jul 2021 13:03)  fluticasone 27.5 mcg/inh nasal spray: 1 spray(s) nasal 2 times a day (13 Jul 2021 13:03)  furosemide 20 mg oral tablet: 1 tab(s) orally once a day (13 Jul 2021 13:03)  levalbuterol 1.25 mg/0.5 mL inhalation solution: 0.5 milliliter(s) inhaled 3 times a day, As Needed (13 Jul 2021 13:03)  lisinopril 5 mg oral tablet: 1 tab(s) orally once a day (13 Jul 2021 13:03)  metoprolol tartrate 50 mg oral tablet: 1 tab(s) orally 2 times a day (13 Jul 2021 13:03)  morphine 15 mg/12 hr oral tablet, extended release: 15 milligram(s) orally 2 times a day (13 Jul 2021 13:03)  Percocet 5/325 oral tablet: 1 tab(s) orally every 6 hours, As Needed (13 Jul 2021 13:03)  simvastatin 20 mg oral tablet: 1 tab(s) orally once a day (at bedtime) (13 Jul 2021 13:03)  Trelegy Ellipta 100 mcg-62.5 mcg-25 mcg/inh inhalation powder: 1 puff(s) inhaled once a day (13 Jul 2021 13:03)  Vitamin D2 50,000 intl units (1.25 mg) oral capsule: 1 cap(s) orally once a week (13 Jul 2021 13:03)        General: No fatigue, no fevers/chills  Respiratory: No dyspnea, no cough, no wheeze  CV: No chest pain, no palpitations  Abd: No nausea  Neuro: No headache, no dizziness  penicillin (Unknown)      Objective:  Vital Signs Last 24 Hrs  T(C): --  T(F): --  HR: 66 (13 Jul 2021 15:00) (66 - 70)  BP: 154/80 (13 Jul 2021 15:00) (143/81 - 154/80)  BP(mean): --  RR: 15 (13 Jul 2021 15:00) (15 - 18)  SpO2: 94% (13 Jul 2021 15:00) (94% - 96%)    CM: SR  Neuro: A&OX3, CN 2-12 intact  HEENT: NC, AT  Lungs: CTA B/L  CV: S1, S2, no murmur, RRR  Abd: Soft  RRA site: Soft, no bleeding, no hematoma  Extremity: + distal pulses      labs:                      16.1   11.13 )-----------( 346      ( 13 Jul 2021 13:31 )             48.0     07-13    141  |  103  |  14.2  ----------------------------<  117<H>  4.1   |  23.0  |  0.90    Ca    9.8      13 Jul 2021 13:31    TPro  7.9  /  Alb  4.8  /  TBili  0.4  /  DBili  x   /  AST  23  /  ALT  41<H>  /  AlkPhos  158<H>  07-13          Assessment: 51 yo male with PMHx of HTN, HLD, obesity (BMI 55), LU on CPAP, Asthma, current smoker. He presented to his cardiologist with c/o BARRETT and was sent for an echocardiogram and stress test.  During his stress test he exhibited 0.5-0.9mm downsloping ST depression and a small sized equivocally abnormal, inferior , fixed defect was evidenced on imaging.      Now s/p left heart catheterization via RRA with no obstructive CAD performed by  Dr. Hernandez, received IA heparin, versed and fentanyl IV intraprocedurally, arrived to recovery in NAD and HDS, RRA access site stable with compression band in place, no bleed/hematoma, distal pulse +, plan to d/C home after recovery period completed.           Plan:  -Formal cath report pending  -Post procedure management/monitoring per protocol  -Access site precautions  -Radial compression band removal at 1630  -Continue current medical therapy  -Educated regarding post procedure management and care  -Discussed the importance of RF modification  -F/U outpt in 1-2 weeks with primary doctor.   -DISPO: Plan for D/C home later today once recovery period completed.

## 2021-10-06 PROBLEM — I10 ESSENTIAL HYPERTENSION: Status: ACTIVE | Noted: 2020-04-06
